# Patient Record
Sex: MALE | HISPANIC OR LATINO
[De-identification: names, ages, dates, MRNs, and addresses within clinical notes are randomized per-mention and may not be internally consistent; named-entity substitution may affect disease eponyms.]

---

## 2024-03-19 PROBLEM — Z00.00 ENCOUNTER FOR PREVENTIVE HEALTH EXAMINATION: Status: ACTIVE | Noted: 2024-03-19

## 2024-03-21 ENCOUNTER — APPOINTMENT (OUTPATIENT)
Dept: OTOLARYNGOLOGY | Facility: CLINIC | Age: 34
End: 2024-03-21
Payer: COMMERCIAL

## 2024-03-21 DIAGNOSIS — Z78.9 OTHER SPECIFIED HEALTH STATUS: ICD-10-CM

## 2024-03-21 DIAGNOSIS — J39.2 OTHER DISEASES OF PHARYNX: ICD-10-CM

## 2024-03-21 PROCEDURE — 99203 OFFICE O/P NEW LOW 30 MIN: CPT

## 2024-03-25 PROBLEM — J39.2: Status: ACTIVE | Noted: 2024-03-25

## 2024-03-25 NOTE — ASSESSMENT
[FreeTextEntry1] : Idiopathic cobblestoning. I explained to him in most instances this is not pathologic and incidental. It commonly occurs with people that have postnasal drip and allergies. He does not have a complaint of postnasal drip and allergies. I would recommend observation at this time.

## 2024-03-25 NOTE — PHYSICAL EXAM
[TextEntry] : He does have some cobblestoning in his oropharynx.  This does not look pathologic. There is no lymphadenopathy.

## 2024-03-25 NOTE — HISTORY OF PRESENT ILLNESS
[de-identified] : Initial visit. His chief complaint today is "cobblestoning of his throat". He reports that he was in his otherwise state approximately 3 and half weeks ago when he developed a sore throat.  Seen in urgent care and had a full workup including evaluations for STD and other cultures.  They were all negative. It was then brought to his attention that he had "cobblestoning" in the back of his throat.  His doctor recommended Flonase which has not helped him. No bright red blood per oral cavity. No history of allergies or complaint of postnasal drip.